# Patient Record
Sex: MALE | Race: BLACK OR AFRICAN AMERICAN | URBAN - METROPOLITAN AREA
[De-identification: names, ages, dates, MRNs, and addresses within clinical notes are randomized per-mention and may not be internally consistent; named-entity substitution may affect disease eponyms.]

---

## 2017-10-06 ENCOUNTER — ED HISTORICAL/CONVERTED ENCOUNTER (OUTPATIENT)
Dept: OTHER | Age: 22
End: 2017-10-06

## 2023-02-21 ENCOUNTER — HOSPITAL ENCOUNTER (EMERGENCY)
Age: 28
Discharge: HOME | End: 2023-02-21
Attending: EMERGENCY MEDICINE
Payer: MEDICAID

## 2023-02-21 VITALS
BODY MASS INDEX: 24.38 KG/M2 | HEIGHT: 72 IN | DIASTOLIC BLOOD PRESSURE: 77 MMHG | SYSTOLIC BLOOD PRESSURE: 127 MMHG | TEMPERATURE: 99.5 F | WEIGHT: 180 LBS | HEART RATE: 92 BPM | OXYGEN SATURATION: 100 % | RESPIRATION RATE: 18 BRPM

## 2023-02-21 DIAGNOSIS — U07.1 COVID: Primary | ICD-10-CM

## 2023-02-21 LAB
FLUAV AG NPH QL IA: NEGATIVE
FLUBV AG NOSE QL IA: NEGATIVE
SARS-COV-2 RDRP RESP QL NAA+PROBE: DETECTED

## 2023-02-21 PROCEDURE — 87804 INFLUENZA ASSAY W/OPTIC: CPT

## 2023-02-21 PROCEDURE — 87635 SARS-COV-2 COVID-19 AMP PRB: CPT

## 2023-02-21 PROCEDURE — 99282 EMERGENCY DEPT VISIT SF MDM: CPT

## 2023-02-21 NOTE — Clinical Note
[facility] EMERGENCY DEPT  [address] [place] [address] [id number] [phone]    Work/School Note    Date: 2/21/2023    To Whom It May concern:    Alfred Will was seen and treated today in the emergency room by the following provider(s):  Attending Provider: Blanca Sarkar is excused from work/school on 2/21/2023 through 2/23/2023. He is medically clear to return to work/school on 2/24/2023.          Sincerely,          Quiana Villar, DO

## 2023-02-21 NOTE — Clinical Note
Work/School Note    Date: 2/21/2023     To Whom It May concern:    Alfred Will was evaluated by the following provider(s):  Attending Provider: Aixa Lima virus is suspected. Per the CDC guidelines we recommend home isolation until the following conditions are all met:    1. At least five days have passed since symptoms first appeared and/or had a close exposure,   2. After home isolation for five days, wearing a mask around others for the next five days,  3. At least 24 have passed since last fever without the use of fever-reducing medications and  4.  Symptoms (eg cough, shortness of breath) have improved      Sincerely,          Yan Bhatia RN

## 2023-02-21 NOTE — ED PROVIDER NOTES
[facility] EMERGENCY DEPT  EMERGENCY DEPARTMENT HISTORY AND PHYSICAL EXAM      Date: 2/21/2023  Patient Name: Alfred Will  MRN: [id number]  YOB: 1995  Date of evaluation: 2/21/2023  Provider: Quiana Villar DO   Note Started: 4:54 PM 2/21/23    HISTORY OF PRESENT ILLNESS     Chief Complaint   Patient presents with    Flu Like Symptoms       History Provided By: Patient    HPI: Alfred Will, 28 y.o. male presenting to the emergency department for evaluation of headache, myalgias, anorexia. Denies nausea, vomiting, diarrhea, constipation. No confirmed sick contacts. Patient reporting feeling cold. Denies fevers. Has not taken anything for symptoms prior to arrival to the emergency department. PAST MEDICAL HISTORY   Past Medical History:  History reviewed. No pertinent past medical history. Past Surgical History:  History reviewed. No pertinent surgical history. Family History:  History reviewed. No pertinent family history. Social History:  Social History     Tobacco Use    Smoking status: Never    Smokeless tobacco: Never       Allergies:  No Known Allergies    PCP: None    Current Meds:   Previous Medications    No medications on file       REVIEW OF SYSTEMS   Review of Systems   Constitutional:  Positive for appetite change, chills and fatigue. Negative for fever. HENT:  Negative for congestion and rhinorrhea. Respiratory:  Negative for cough and shortness of breath. Cardiovascular:  Negative for chest pain and palpitations. Gastrointestinal:  Negative for abdominal pain, diarrhea, nausea and vomiting. Musculoskeletal:  Positive for myalgias. Negative for arthralgias. Skin:  Negative for color change and rash. Neurological:  Positive for headaches. Negative for weakness. Positives and Pertinent negatives as per HPI.     PHYSICAL EXAM     ED Triage Vitals [02/21/23 1527]   ED Encounter Vitals Group      /77      Pulse (Heart Rate) 85      Resp Rate 18      Temp 99.5 °F (37.5 °C)      Temp src       O2 Sat (%) 99 %      Weight 180 lb      Height 6'      Physical Exam  Constitutional:       General: He is not in acute distress. Appearance: Normal appearance. He is not ill-appearing. HENT:      Head: Normocephalic and atraumatic. Right Ear: External ear normal.      Left Ear: External ear normal.      Nose: Nose normal.      Mouth/Throat:      Mouth: Mucous membranes are moist.   Eyes:      Extraocular Movements: Extraocular movements intact. Conjunctiva/sclera: Conjunctivae normal.      Pupils: Pupils are equal, round, and reactive to light. Cardiovascular:      Rate and Rhythm: Normal rate and regular rhythm. Pulses: Normal pulses. Pulmonary:      Effort: Pulmonary effort is normal. No respiratory distress. Breath sounds: Normal breath sounds. Abdominal:      General: Abdomen is flat. There is no distension. Musculoskeletal:         General: Normal range of motion. Cervical back: Normal range of motion. Skin:     General: Skin is warm and dry. Neurological:      General: No focal deficit present. Mental Status: He is alert and oriented to person, place, and time. Psychiatric:         Mood and Affect: Mood normal.         Behavior: Behavior normal.         Thought Content: Thought content normal.         Judgment: Judgment normal.       SCREENINGS               No data recorded      LAB, EKG AND DIAGNOSTIC RESULTS   Labs:  Recent Results (from the past 12 hour(s))   INFLUENZA A & B AG (RAPID TEST)    Collection Time: 02/21/23  4:51 PM   Result Value Ref Range    Influenza A Antigen Negative Negative      Influenza B Antigen Negative Negative     COVID-19 RAPID TEST    Collection Time: 02/21/23  4:51 PM   Result Value Ref Range    COVID-19 rapid test DETECTED (A) Not Detected         EKG: Initial EKG interpreted by me.  Shows     Radiologic Studies:  Non-plain film images such as CT, Ultrasound and MRI are read by the radiologist. Plain radiographic images are visualized and preliminarily interpreted by the ED Provider with the below findings:    *    Interpretation per the Radiologist below, if available at the time of this note:  No results found. PROCEDURES   Unless otherwise noted below, none. Performed by: Montana Sommer DO   Procedures      CRITICAL CARE TIME       ED COURSE and DIFFERENTIAL DIAGNOSIS/MDM   Vitals:    Vitals:    02/21/23 1527   BP: 121/77   Pulse: 85   Resp: 18   Temp: 99.5 °F (37.5 °C)   SpO2: 99%   Weight: 81.6 kg (180 lb)   Height: 6' (1.829 m)        Patient was given the following medications:  Medications - No data to display    CONSULTS: (Who and What was discussed)  None     Chronic Conditions: None  Social Determinants affecting Dx or Tx: None  Counseling:      Records Reviewed (source and summary of external notes): Nursing notes    CC/HPI Summary, DDx, ED Course, and Reassessment: 69-year-old male presenting to the emergency department for evaluation of generalized myalgias, headache, anorexia x1 day. No confirmed sick contacts. Reports subjective chills. On exam, patient nontoxic-appearing. Vital signs within normal limits. COVID, flu swabs ordered. Influenza negative. COVID is positive. Disposition Considerations (Tests not done, Shared Decision Making, Pt Expectation of Test or Treatment.):  Patient presenting with myalgias, headache, anorexia. Exam consistent with viral syndrome. Patient is nontoxic-appearing with normal vitals. Do not feel that further work-up is warranted at this time. FINAL IMPRESSION     1. COVID          DISPOSITION/PLAN   Discharged    Discharge Note: The patient is stable for discharge home. The signs, symptoms, diagnosis, and discharge instructions have been discussed, understanding conveyed, and agreed upon. The patient is to follow up as recommended or return to ER should their symptoms worsen.       PATIENT REFERRED TO:  Follow-up Information Follow up With Specialties Details Why [address] [place]    [address] [facility] EMERGENCY DEPT Emergency Medicine  As needed, If symptoms worsen [facility]  [phone]              DISCHARGE MEDICATIONS:  There are no discharge medications for this patient. DISCONTINUED MEDICATIONS:  There are no discharge medications for this patient. I am the Primary Clinician of Record: Frederick Ames DO (electronically signed)    (Please note that parts of this dictation were completed with voice recognition software. Quite often unanticipated grammatical, syntax, homophones, and other interpretive errors are inadvertently transcribed by the computer software. Please disregards these errors.  Please excuse any errors that have escaped final proofreading.)

## 2023-02-21 NOTE — DISCHARGE INSTRUCTIONS
Thank you! Thank you for allowing me to care for you in the emergency department. It is my goal to provide you with excellent care. If you have not received excellent quality care, please ask to speak to the nurse manager. Please fill out the survey that will come to you by mail or email since we listen to your feedback! Below you will find a list of your tests from today's visit. Should you have any questions, please do not hesitate to call the emergency department. Labs  Recent Results (from the past 12 hour(s))   INFLUENZA A & B AG (RAPID TEST)    Collection Time: 02/21/23  4:51 PM   Result Value Ref Range    Influenza A Antigen Negative Negative      Influenza B Antigen Negative Negative     COVID-19 RAPID TEST    Collection Time: 02/21/23  4:51 PM   Result Value Ref Range    COVID-19 rapid test DETECTED (A) Not Detected         Radiologic Studies  No orders to display     CT Results  (Last 48 hours)      None          CXR Results  (Last 48 hours)      None          ------------------------------------------------------------------------------------------------------------  The exam and treatment you received in the Emergency Department were for an urgent problem and are not intended as complete care. It is important that you follow-up with a doctor, nurse practitioner, or physician assistant to:  (1) confirm your diagnosis,  (2) re-evaluation of changes in your illness and treatment, and  (3) for ongoing care. Please take your discharge instructions with you when you go to your follow-up appointment. If you have any problem arranging a follow-up appointment, contact the Emergency Department. If your symptoms become worse or you do not improve as expected and you are unable to reach your health care provider, please return to the Emergency Department. We are available 24 hours a day. If a prescription has been provided, please have it filled as soon as possible to prevent a delay in treatment. If you have any questions or reservations about taking the medication due to side effects or interactions with other medications, please call your primary care provider or contact the ER.

## 2023-02-21 NOTE — ED TRIAGE NOTES
C/o headache and body aches since this morning  Decreased appetite today, denies vomiting and nausea.

## 2023-02-21 NOTE — Clinical Note
Work/School Note    Date: 2/21/2023     To Whom It May concern:    Kerri Vera was evaluated by the following provider(s):  Attending Provider: Abiel Harrison virus is suspected. Per the CDC guidelines we recommend home isolation until the following conditions are all met:    1. At least five days have passed since symptoms first appeared and/or had a close exposure,   2. After home isolation for five days, wearing a mask around others for the next five days,  3. At least 24 have passed since last fever without the use of fever-reducing medications and  4.  Symptoms (eg cough, shortness of breath) have improved      Sincerely,          Anjelica Garrison, DO

## 2023-02-21 NOTE — Clinical Note
Work/School Note    Date: 2/21/2023     To Whom It May concern:    Manoj George was evaluated by the following provider(s):  Attending Provider: Donavan Harrington virus is suspected. Per the CDC guidelines we recommend home isolation until the following conditions are all met:    1. At least five days have passed since symptoms first appeared and/or had a close exposure,   2. After home isolation for five days, wearing a mask around others for the next five days,  3. At least 24 have passed since last fever without the use of fever-reducing medications and  4.  Symptoms (eg cough, shortness of breath) have improved      Sincerely,          Karissa Salgado RN